# Patient Record
Sex: FEMALE | Race: WHITE
[De-identification: names, ages, dates, MRNs, and addresses within clinical notes are randomized per-mention and may not be internally consistent; named-entity substitution may affect disease eponyms.]

---

## 2018-02-23 ENCOUNTER — HOSPITAL ENCOUNTER (OUTPATIENT)
Dept: HOSPITAL 45 - C.MAMM | Age: 68
Discharge: HOME | End: 2018-02-23
Attending: OBSTETRICS & GYNECOLOGY
Payer: COMMERCIAL

## 2018-02-23 DIAGNOSIS — N64.89: ICD-10-CM

## 2018-02-23 DIAGNOSIS — Z12.31: Primary | ICD-10-CM

## 2018-02-26 NOTE — MAMMOGRAPHY REPORT
BILATERAL DIGITAL SCREENING MAMMOGRAM TOMOSYNTHESIS WITH CAD: 2/23/2018

CLINICAL HISTORY: Routine screening examination.  





TECHNIQUE:  Breast tomosynthesis in addition to standard 2D mammography was performed. Current study 
was also evaluated with a Computer Aided Detection (CAD) system.  



COMPARISON: Comparison is made to exams dated:  7/30/2015 mammogram, 5/9/2013 mammogram, 8/4/2011 prema
mogram, 8/4/2011 ultrasound, 1/21/2011 mammogram - [facility], and 7/7/2009.   



BREAST COMPOSITION:  There are scattered areas of fibroglandular density in both breasts.  



FINDINGS:  There is possible architectural distortion in the lateral, middle one third of the right b
reast, best seen in the CC projection (CC tomosynthesis slice 20/49), but thought to project superior
ly on the MLO view.  Further characterization with spot compression tomosynthesis views and possible 
ultrasound are recommended.



There is a stable benign coarse calcification in the far posterior lateral right breast. No other judy
picious mass, architectural distortion or cluster of microcalcifications is seen.  



IMPRESSION:  ACR BI-RADS CATEGORY 0: INCOMPLETE EVALUATION:  NEED ADDITIONAL IMAGING EVALUATION

The possible area of architectural distortion in the lateral right breast needs additional evaluation
.  

The patient will be called to schedule an appointment.  





Approximately 10% of breast cancers are not detected with mammography. A negative mammographic report
 should not delay biopsy if a clinically suggestive mass is present.



Tete Turner M.D.          

ay/:2/23/2018 16:54:16  



Imaging Technologist: Margie NEWBY(GWEN)(M), [facility]

letter sent: Addl Imaging 0  

BI-RADS Code: ACR BI-RADS Category 0: Incomplete Evaluation:  Need Additional Imaging Evaluation

## 2018-03-12 ENCOUNTER — HOSPITAL ENCOUNTER (OUTPATIENT)
Dept: HOSPITAL 45 - C.MAMM | Age: 68
Discharge: HOME | End: 2018-03-12
Attending: OBSTETRICS & GYNECOLOGY
Payer: COMMERCIAL

## 2018-03-12 DIAGNOSIS — R92.8: Primary | ICD-10-CM

## 2018-03-12 DIAGNOSIS — N64.89: ICD-10-CM

## 2018-03-12 NOTE — MAMMOGRAPHY REPORT
UNILATERAL RIGHT DIGITAL DIAGNOSTIC MAMMOGRAM TOMOSYNTHESIS AND TARGETED RIGHT ULTRASOUND: 3/12/2018

CLINICAL HISTORY: 67-year-old woman called back from screening mammography for a possible area of arc
hitectural distortion in the lateral right breast, best seen on the cc view.  





TECHNIQUE: Spot compression right CC and MLO tomosynthesis images were obtained.



COMPARISON: Comparison is made to exams dated:  2/23/2018 mammogram, 7/30/2015 mammogram, 5/9/2013 ma
mmogram, 8/4/2011 mammogram, 8/4/2011 ultrasound, and 1/21/2011 mammogram - [facility]
ter.   



BREAST COMPOSITION:  The tissue of the right breast is heterogeneously dense, which may obscure small
 masses.  



FINDINGS: There is no persistent architectural distortion in the lateral right breast with the supple
mental spot compression tomosynthesis view.  No corresponding abnormality or area of distortion is id
entified on the spot compression MLO tomosynthesis view.



Targeted ultrasound was performed in the lateral right breast in the area of possible architectural d
istortion seen mammographically.  Sonographically normal tissue is seen without evidence of a suspici
ous solid or cystic mass.  There is a hypoechoic shadowing 1.3 cm mass with a thin superficial curvil
inear echogenic rim in the 10:00 right breast 8 cm from the nipple, compatible with the benign coarse
 calcification in the upper outer quadrant seen mammographically.



IMPRESSION:  ACR BI-RADS CATEGORY 2: BENIGN, TARGETED ULTRASOUND ACR BI-RADS CATEGORY 2: BENIGN 

There is no persistent architectural distortion in the lateral right breast with supplemental spot co
mpression tomosynthesis images, and no suspicious sonographic correlate identified.  This most likely
 represented normal overlapping tissue.  There is no mammographic or targeted sonographic evidence of
 malignancy in the right breast.  Recommend return to annual screening mammography schedule.



These results and recommendations were discussed with the patient at the time of the exam.





Approximately 10% of breast cancers are not detected with mammography. A negative mammographic report
 should not delay biopsy if a clinically suggestive mass is present.



Tete Turner M.D.          

ay/:3/12/2018 11:36:48  



Imaging Technologist: Ling CADET)(YENNI), [facility]

letter sent: Normal 1/2  

BI-RADS Code: ACR BI-RADS Category 2: Benign  Ultrasound BI-RADS: ACR BI-RADS Category 2: Benign

## 2018-05-11 ENCOUNTER — HOSPITAL ENCOUNTER (OUTPATIENT)
Dept: HOSPITAL 45 - C.PAPS | Age: 68
Discharge: HOME | End: 2018-05-11
Attending: OBSTETRICS & GYNECOLOGY
Payer: COMMERCIAL

## 2018-05-11 DIAGNOSIS — Z01.419: Primary | ICD-10-CM
